# Patient Record
Sex: MALE | Race: WHITE | Employment: UNEMPLOYED | URBAN - METROPOLITAN AREA
[De-identification: names, ages, dates, MRNs, and addresses within clinical notes are randomized per-mention and may not be internally consistent; named-entity substitution may affect disease eponyms.]

---

## 2018-02-02 ENCOUNTER — APPOINTMENT (OUTPATIENT)
Dept: ULTRASOUND IMAGING | Age: 33
DRG: 101 | End: 2018-02-02
Attending: INTERNAL MEDICINE
Payer: MEDICARE

## 2018-02-02 ENCOUNTER — APPOINTMENT (OUTPATIENT)
Dept: CT IMAGING | Age: 33
DRG: 101 | End: 2018-02-02
Attending: INTERNAL MEDICINE
Payer: MEDICARE

## 2018-02-02 ENCOUNTER — HOSPITAL ENCOUNTER (INPATIENT)
Age: 33
LOS: 1 days | Discharge: HOME | DRG: 101 | End: 2018-02-03
Attending: EMERGENCY MEDICINE | Admitting: INTERNAL MEDICINE
Payer: MEDICARE

## 2018-02-02 DIAGNOSIS — R41.82 ALTERED MENTAL STATUS, UNSPECIFIED ALTERED MENTAL STATUS TYPE: ICD-10-CM

## 2018-02-02 DIAGNOSIS — I48.91 ATRIAL FIBRILLATION WITH RVR (HCC): Primary | ICD-10-CM

## 2018-02-02 DIAGNOSIS — I65.23 BILATERAL CAROTID ARTERY STENOSIS: ICD-10-CM

## 2018-02-02 DIAGNOSIS — R56.9 SEIZURE (HCC): ICD-10-CM

## 2018-02-02 DIAGNOSIS — F79 MENTAL IMPAIRMENT: ICD-10-CM

## 2018-02-02 DIAGNOSIS — G40.209 COMPLEX PARTIAL SEIZURE EVOLVING TO GENERALIZED SEIZURE (HCC): ICD-10-CM

## 2018-02-02 DIAGNOSIS — R77.8 ELEVATED TROPONIN: ICD-10-CM

## 2018-02-02 DIAGNOSIS — R55 CONVULSIVE SYNCOPE: ICD-10-CM

## 2018-02-02 DIAGNOSIS — G80.1 SPASTIC DIPLEGIC CEREBRAL PALSY (HCC): ICD-10-CM

## 2018-02-02 PROBLEM — G80.9 CP (CEREBRAL PALSY) (HCC): Status: ACTIVE | Noted: 2018-02-02

## 2018-02-02 PROBLEM — I48.0 PAROXYSMAL ATRIAL FIBRILLATION WITH RVR (HCC): Status: ACTIVE | Noted: 2018-02-02

## 2018-02-02 PROBLEM — G47.33 OSA (OBSTRUCTIVE SLEEP APNEA): Status: ACTIVE | Noted: 2018-02-02

## 2018-02-02 PROBLEM — R79.89 ELEVATED TSH: Status: ACTIVE | Noted: 2018-02-02

## 2018-02-02 LAB
APPEARANCE UR: CLEAR
ATRIAL RATE: 107 BPM
BACTERIA URNS QL MICRO: NEGATIVE /HPF
BILIRUB UR QL: NEGATIVE
CALCULATED P AXIS, ECG09: 20 DEGREES
CALCULATED R AXIS, ECG10: 30 DEGREES
CALCULATED T AXIS, ECG11: 58 DEGREES
CK MB CFR SERPL CALC: 0.7 % (ref 0–2.5)
CK MB SERPL-MCNC: 2.5 NG/ML (ref 5–25)
CK SERPL-CCNC: 346 U/L (ref 39–308)
COLOR UR: ABNORMAL
DIAGNOSIS, 93000: NORMAL
EPITH CASTS URNS QL MICRO: ABNORMAL /LPF
FLUAV AG NPH QL IA: NEGATIVE
FLUBV AG NOSE QL IA: NEGATIVE
GLUCOSE UR STRIP.AUTO-MCNC: NEGATIVE MG/DL
HGB UR QL STRIP: ABNORMAL
KETONES UR QL STRIP.AUTO: NEGATIVE MG/DL
LEUKOCYTE ESTERASE UR QL STRIP.AUTO: NEGATIVE
NITRITE UR QL STRIP.AUTO: NEGATIVE
P-R INTERVAL, ECG05: 164 MS
PH UR STRIP: 5.5 [PH] (ref 5–8)
PROT UR STRIP-MCNC: 300 MG/DL
Q-T INTERVAL, ECG07: 310 MS
QRS DURATION, ECG06: 84 MS
QTC CALCULATION (BEZET), ECG08: 413 MS
RBC #/AREA URNS HPF: ABNORMAL /HPF (ref 0–5)
SP GR UR REFRACTOMETRY: 1.03 (ref 1–1.03)
TROPONIN I SERPL-MCNC: 0.25 NG/ML
TROPONIN I SERPL-MCNC: 0.31 NG/ML
TROPONIN I SERPL-MCNC: 0.32 NG/ML
UA: UC IF INDICATED,UAUC: ABNORMAL
UROBILINOGEN UR QL STRIP.AUTO: 1 EU/DL (ref 0.2–1)
VENTRICULAR RATE, ECG03: 107 BPM
WBC URNS QL MICRO: ABNORMAL /HPF (ref 0–4)

## 2018-02-02 PROCEDURE — 74011250636 HC RX REV CODE- 250/636: Performed by: EMERGENCY MEDICINE

## 2018-02-02 PROCEDURE — 76705 ECHO EXAM OF ABDOMEN: CPT

## 2018-02-02 PROCEDURE — 36415 COLL VENOUS BLD VENIPUNCTURE: CPT | Performed by: INTERNAL MEDICINE

## 2018-02-02 PROCEDURE — 96372 THER/PROPH/DIAG INJ SC/IM: CPT

## 2018-02-02 PROCEDURE — 95816 EEG AWAKE AND DROWSY: CPT | Performed by: INTERNAL MEDICINE

## 2018-02-02 PROCEDURE — C8929 TTE W OR WO FOL WCON,DOPPLER: HCPCS

## 2018-02-02 PROCEDURE — 84484 ASSAY OF TROPONIN QUANT: CPT | Performed by: EMERGENCY MEDICINE

## 2018-02-02 PROCEDURE — 80175 DRUG SCREEN QUAN LAMOTRIGINE: CPT | Performed by: PSYCHIATRY & NEUROLOGY

## 2018-02-02 PROCEDURE — 82553 CREATINE MB FRACTION: CPT | Performed by: EMERGENCY MEDICINE

## 2018-02-02 PROCEDURE — 82550 ASSAY OF CK (CPK): CPT | Performed by: EMERGENCY MEDICINE

## 2018-02-02 PROCEDURE — 74011250637 HC RX REV CODE- 250/637: Performed by: PSYCHIATRY & NEUROLOGY

## 2018-02-02 PROCEDURE — 70450 CT HEAD/BRAIN W/O DYE: CPT

## 2018-02-02 PROCEDURE — 65660000000 HC RM CCU STEPDOWN

## 2018-02-02 PROCEDURE — 74011250637 HC RX REV CODE- 250/637: Performed by: INTERNAL MEDICINE

## 2018-02-02 PROCEDURE — 74011250637 HC RX REV CODE- 250/637: Performed by: NURSE PRACTITIONER

## 2018-02-02 PROCEDURE — 99285 EMERGENCY DEPT VISIT HI MDM: CPT

## 2018-02-02 PROCEDURE — 81001 URINALYSIS AUTO W/SCOPE: CPT | Performed by: INTERNAL MEDICINE

## 2018-02-02 PROCEDURE — 93005 ELECTROCARDIOGRAM TRACING: CPT

## 2018-02-02 PROCEDURE — 87804 INFLUENZA ASSAY W/OPTIC: CPT | Performed by: EMERGENCY MEDICINE

## 2018-02-02 PROCEDURE — 74011250636 HC RX REV CODE- 250/636: Performed by: INTERNAL MEDICINE

## 2018-02-02 PROCEDURE — 93880 EXTRACRANIAL BILAT STUDY: CPT

## 2018-02-02 RX ORDER — LAMOTRIGINE 100 MG/1
100 TABLET ORAL 2 TIMES DAILY
Status: DISCONTINUED | OUTPATIENT
Start: 2018-02-02 | End: 2018-02-02

## 2018-02-02 RX ORDER — LAMOTRIGINE 100 MG/1
100 TABLET ORAL 2 TIMES DAILY
COMMUNITY
End: 2018-02-03

## 2018-02-02 RX ORDER — ENOXAPARIN SODIUM 100 MG/ML
40 INJECTION SUBCUTANEOUS EVERY 12 HOURS
Status: DISCONTINUED | OUTPATIENT
Start: 2018-02-02 | End: 2018-02-03 | Stop reason: HOSPADM

## 2018-02-02 RX ORDER — GUAIFENESIN 100 MG/5ML
81 LIQUID (ML) ORAL DAILY
Status: DISCONTINUED | OUTPATIENT
Start: 2018-02-02 | End: 2018-02-03 | Stop reason: HOSPADM

## 2018-02-02 RX ORDER — SODIUM CHLORIDE 0.9 % (FLUSH) 0.9 %
SYRINGE (ML) INJECTION
Status: DISCONTINUED
Start: 2018-02-02 | End: 2018-02-03 | Stop reason: HOSPADM

## 2018-02-02 RX ORDER — ONDANSETRON 2 MG/ML
4 INJECTION INTRAMUSCULAR; INTRAVENOUS
Status: DISCONTINUED | OUTPATIENT
Start: 2018-02-02 | End: 2018-02-03 | Stop reason: HOSPADM

## 2018-02-02 RX ORDER — METOPROLOL TARTRATE 25 MG/1
12.5 TABLET, FILM COATED ORAL EVERY 12 HOURS
Status: DISCONTINUED | OUTPATIENT
Start: 2018-02-02 | End: 2018-02-03 | Stop reason: HOSPADM

## 2018-02-02 RX ORDER — LORAZEPAM 2 MG/ML
2 INJECTION INTRAMUSCULAR
Status: DISCONTINUED | OUTPATIENT
Start: 2018-02-02 | End: 2018-02-03 | Stop reason: HOSPADM

## 2018-02-02 RX ORDER — ACETAMINOPHEN 325 MG/1
650 TABLET ORAL
Status: DISCONTINUED | OUTPATIENT
Start: 2018-02-02 | End: 2018-02-03 | Stop reason: HOSPADM

## 2018-02-02 RX ADMIN — ENOXAPARIN SODIUM 40 MG: 40 INJECTION SUBCUTANEOUS at 20:21

## 2018-02-02 RX ADMIN — METOPROLOL TARTRATE 12.5 MG: 25 TABLET ORAL at 22:42

## 2018-02-02 RX ADMIN — ASPIRIN 81 MG 81 MG: 81 TABLET ORAL at 10:28

## 2018-02-02 RX ADMIN — METOPROLOL TARTRATE 12.5 MG: 25 TABLET ORAL at 10:29

## 2018-02-02 RX ADMIN — ENOXAPARIN SODIUM 40 MG: 40 INJECTION SUBCUTANEOUS at 10:28

## 2018-02-02 RX ADMIN — LAMOTRIGINE 150 MG: 100 TABLET ORAL at 17:11

## 2018-02-02 NOTE — IP AVS SNAPSHOT
[address] [facility] 
[phone] Patient: Jaspreet Landis MRN: [id number] GUU:[id number] About your hospitalization You were admitted on:  February 2, 2018 You last received care in the:  [facility] 3 NEUROSCIENCE TELEMETRY You were discharged on:  February 3, 2018 Why you were hospitalized Your primary diagnosis was:  Seizures (Hcc) Your diagnoses also included:  Paroxysmal Atrial Fibrillation With Rvr (Hcc), Cp (Cerebral Palsy) (Hcc), Nura (Obstructive Sleep Apnea), Elevated Tsh, Elevated Troponin, Complex Partial Seizure Evolving To Generalized Seizure (Hcc), Altered Mental Status, Unspecified, Mental Impairment, Bilateral Carotid Artery Stenosis, Convulsive Syncope Follow-up Information Follow up With Details Comments Contact Info Dariana Parsons NP In 1 week  [address] [facility] 
[phone] 
  
 [place] [facility] [place]   2 [place] Carmen Murphy MD In 3 weeks 3-4 weeks ( cardiology)  [address] [place] [address] 
[phone] Discharge Orders None A check veda indicates which time of day the medication should be taken. My Medications START taking these medications Instructions Each Dose to Equal  
 Morning Noon Evening Bedtime  
 aspirin 81 mg chewable tablet Start taking on:  2/4/2018 Your last dose was: Your next dose is: Take 1 Tab by mouth daily. 81 mg  
    
   
   
   
  
 metoprolol tartrate 25 mg tablet Commonly known as:  LOPRESSOR Your last dose was: Your next dose is: Take 0.5 Tabs by mouth every twelve (12) hours. 12.5 mg  
    
   
   
   
  
  
CHANGE how you take these medications Instructions Each Dose to Equal  
 Morning Noon Evening Bedtime  
 lamoTRIgine 150 mg tablet Commonly known as: LaMICtal  
What changed:   
- medication strength - how much to take Your last dose was: Your next dose is: Take 1 Tab by mouth two (2) times a day. 150 mg Where to Get Your Medications Information on where to get these meds will be given to you by the nurse or doctor. ! Ask your nurse or doctor about these medications  
  aspirin 81 mg chewable tablet  
 lamoTRIgine 150 mg tablet  
 metoprolol tartrate 25 mg tablet Discharge Instructions Patient Discharge Instructions Oneyda Reaves / [id number] : 1985 Admitted 2018 Discharged: 2/3/2018 DISCHARGE DIAGNOSIS:  
 
Seizure  - Lamictal was increased ; follow with neurology at [facility] as scheduled Atrial fibrillation ( fast heart beat) - started on aspirin and metoprolol  
                                                    - follow with cardiology You may have obstructive sleep apnea - follow with PCP to discuss sleep study. Untreated MIKE can contribute to atrial fibrilation Abnormal thyroid function - test need to be repeated  in 4-6 weeks with PCP Take Home Medications General drug facts If you have a very bad allergy, wear an allergy ID at all times. It is important that you take the medication exactly as they are prescribed. Keep your medication in the bottles provided by the pharmacist. 
Keep a list of all your drugs (prescription, natural products, vitamins, OTC) with you. Give this list to your doctor. Do not take other medications without consulting your doctor. Do not share your drugs with others and do not take anyone else's drugs. Keep all drugs out of the reach of children and pets. Most drugs may be thrown away in household trash after mixing with coffee grounds or german litter and sealing in a plastic bag. Keep a list Call your doctor for help with any side effects. If in the [place] you may also call the FDA at 4-147-FDA-7609 Talk with the doctor before starting any new drug, including OTC, natural products, or vitamins. What to do at [facility] 1. Recommended diet:cardiac 2. Recommended activity: Activity as tolerated 3. If you experience any of the following symptoms then please call your primary care physician or return to the emergency room if you cannot get hold of your doctor: seizures / palpitation/ chest pain 4. Lab work: repeat thyroid function with PCP 5. Bring these papers with you to your follow up appointments. The papers will help your doctors be sure to continue the care plan from the hospital. 
 
 
Follow-up with:  
PCP: Jessica Lam NP Follow-up Information Follow up With Details Comments Contact Info Jessica Lam NP In 1 week  [address] [facility] 
[phone] 
  
 [place] [facility] [place]   2 weeks Marie Marrufo MD In 3 weeks 3-4 weeks ( cardiology)  [address] [facility] [address]. [phone] Please call for your own appointment Information obtained by : 
I understand that if any problems occur once I am at home I am to contact my physician. I understand and acknowledge receipt of the instructions indicated above. Physician's or R.N.'s Signature                                                                  Date/Time Patient or Representative Signature                                                          Date/Time [employer] Announcement We are excited to announce that we are making your provider's discharge notes available to you in [employer].   You will see these notes when they are completed and signed by the physician that discharged you from your recent hospital stay. If you have any questions or concerns about any information you see in [employer], please call the Health Information Department where you were seen or reach out to your Primary Care Provider for more information about your plan of care. Introducing [facility]! [employer] introduces [employer] patient portal. Now you can access parts of your medical record, email your doctor's office, and request medication refills online. 1. In your internet browser, go to [email or web address] 2. Click on the First Time User? Click Here link in the Sign In box. You will see the New Member Sign Up page. 3. Enter your [employer] Access Code exactly as it appears below. You will not need to use this code after youve completed the sign-up process. If you do not sign up before the expiration date, you must request a new code. · [employer] Access Code: [email or web address] Expires: 5/4/2018 12:03 PM 
 
4. Enter the last four digits of your Social Security Number (xxxx) and Date of Birth (mm/dd/yyyy) as indicated and click Submit. You will be taken to the next sign-up page. 5. Create a [employer] ID. This will be your [employer] login ID and cannot be changed, so think of one that is secure and easy to remember. 6. Create a [employer] password. You can change your password at any time. 7. Enter your Password Reset Question and Answer. This can be used at a later time if you forget your password. 8. Enter your e-mail address. You will receive e-mail notification when new information is available in [address] 9. Click Sign Up. You can now view and download portions of your medical record. 10. Click the Download Summary menu link to download a portable copy of your medical information.  
 
If you have questions, please visit the Frequently Asked Questions section of the [employer]. Remember, MyChart is NOT to be used for urgent needs. For medical emergencies, dial 911. Now available from your iPhone and Android! Unresulted Labs-Please follow up with your PCP about these lab tests Order Current Status FROILAN QL, W/REFLEX CASCADE In process LAMOTRIGINE (LAMICTAL) In process LAMOTRIGINE (LAMICTAL) In process VITAMIN D, 25 HYROXY PANEL In process Providers Seen During Your Hospitalization Provider Specialty Primary office phone Aung Thompson. Cindy Perez MD Emergency Medicine [phone] Tahmina Barkley MD Emergency Medicine [phone] Garfield Valdez MD Internal Medicine [phone] Your Primary Care Physician (PCP) Primary Care Physician Office Phone Office Fax Adama bell, [address] [facility] [phone] You are allergic to the following No active allergies Recent Documentation Height Weight BMI Smoking Status 1.727 m 136.1 kg 45.61 kg/m2 Never Smoker Emergency Contacts Name Discharge Info Relation Home Work Mobile Lupis Copeland DISCHARGE CAREGIVER [3] Mother [14] [phone] Patient Belongings The following personal items are in your possession at time of discharge: 
  Dental Appliances: None  Visual Aid: None      Home Medications: None   Jewelry: None  Clothing: At bedside    Other Valuables: None Please provide this summary of care documentation to your next provider. Signatures-by signing, you are acknowledging that this After Visit Summary has been reviewed with you and you have received a copy. Patient Signature:  ____________________________________________________________ Date:  ____________________________________________________________  
  
Diana Garcia Provider Signature:  ____________________________________________________________ Date:  ____________________________________________________________

## 2018-02-02 NOTE — PROGRESS NOTES
TRANSFER - IN REPORT:    Verbal report received from [facility], RN on Cosmo Osler  being received from ER (unit) for routine progression of care      Report consisted of patients Situation, Background, Assessment and   Recommendations(SBAR). Information from the following report(s) SBAR, Kardex and Recent Results was reviewed with the receiving nurse. Opportunity for questions and clarification was provided. Assessment completed upon patients arrival to unit and care assumed.

## 2018-02-02 NOTE — ED NOTES
Pt anxious, needy, unable to explain what he is wanting. Concerned about self removing equipment. Family in with patient.

## 2018-02-02 NOTE — CONSULTS
[address]  [phone]      [address] [facility]     Date of  Admission: 2/2/2018  3:46 AM     Admission type:Emergency    Consult for: afib with RVR, elevated troponin  Consult by: ED MD     Subjective:     Mirella Lo is a 33 y.o. male admitted for Seizures ([facility] [address] No previous cardiac hx. Hx of Cerebral palsy, does not walk, seizures. Admitted to outside hospital with seizure (1st one in 2 years), found to be in afib with RVR at 150-180s. Started on IV Dilt, converted to SR, Dilt discontinued. Family at bedside providing information, as well as patient. Mother states that in the past when he has seizures he does have a fast HR, but never told he had afib. +MIKE, but did not complete full study, unable to wear mask, sleeps in recliner and snores loudly. Patient denies CP, SOB, palpitations, recent illness, n/v/diarrhea. ECG at OSH afib with RVR. ECG in ED SR with no acute changes  Troponin 0.16 - 0.32.  OSH, TSH 6. Repeats pending    Cardiac risk factors: obesity, sedentary life style, male gender.       Patient Active Problem List    Diagnosis Date Noted    Seizures ([facility] [address] 02/02/2018    Paroxysmal atrial fibrillation with RVR ([facility] [address] 02/02/2018    CP (cerebral palsy) ([facility] [address] 02/02/2018    MIKE (obstructive sleep apnea) 02/02/2018    Elevated TSH 02/02/2018    Elevated troponin 02/02/2018      Tevin Edwards NP  Past Medical History:   Diagnosis Date    Cerebral palsy ([facility] [address]     CP (cerebral palsy) ([facility] [address] 2/2/2018    Elevated troponin 2/2/2018    Elevated TSH 2/2/2018    Obesity     MIKE (obstructive sleep apnea) 2/2/2018    Paroxysmal atrial fibrillation with RVR ([facility] [address] 2/2/2018    Seizures ([facility] [address]       Social History     Social History    Marital status: SINGLE     Spouse name: N/A    Number of children: N/A    Years of education: N/A     Social History Main Topics    Smoking status: Never Smoker    Smokeless tobacco: Never Used    Alcohol use None    Drug use: No    Sexual activity: Not Asked     Other Topics Concern    None     Social History Narrative    None     No Known Allergies   History reviewed. No pertinent family history. Current Facility-Administered Medications   Medication Dose Route Frequency    acetaminophen (TYLENOL) tablet 650 mg  650 mg Oral Q4H PRN    ondansetron (ZOFRAN) injection 4 mg  4 mg IntraVENous Q4H PRN    enoxaparin (LOVENOX) injection 40 mg  40 mg SubCUTAneous Q12H    LORazepam (ATIVAN) injection 2 mg  2 mg IntraVENous Q6H PRN    aspirin chewable tablet 81 mg  81 mg Oral DAILY    perflutren lipid microspheres (DEFINITY) contrast injection 2 mL  2 mL IntraVENous ONCE     Current Outpatient Prescriptions   Medication Sig    lamoTRIgine (LAMICTAL) 100 mg tablet Take 100 mg by mouth two (2) times a day.         Review of Symptoms:   Constitutional: negative  Eyes: negative   Ears, nose, mouth, throat, and face: negative  Respiratory: negative   Cardiovascular: negative   Gastrointestinal: negative  Genitourinary:negative   Musculoskeletal:CP, does not walk  Neurological: hx of seizures  Endocrine: negative          Objective:      Visit Vitals    /68 (BP 1 Location: Left arm, BP Patient Position: At rest)    Pulse (!) 117    Temp 99.1 °F (37.3 °C)    Resp 24    Ht 5' 8\" (1.727 m)    Wt 136.1 kg (300 lb)    SpO2 96%    BMI 45.61 kg/m2       Physical:   General: morbidly obese  male in no acute distress  Heart: tachy, no m/S3/JVD, no carotid bruits   Lungs: clear   Abdomen: Soft, +BS, NTND   Extremities: LE shirley +DP/PT, no edema     Data Review:   Please see labs from OSH    Recent Labs      02/02/18   0410   TROIQ  0.32*   CKMB  2.5         Intake/Output Summary (Last 24 hours) at 02/02/18 1016  Last data filed at 02/02/18 0900   Gross per 24 hour   Intake                0 ml   Output              800 ml   Net             -800 ml Cardiographics    Telemetry: ST  ECG: SR with no acute chanages    Echocardiogram: pending  CXRAY:       Assessment:       Principal Problem:    Seizures ([address] (2/2/2018)    Active Problems:    Paroxysmal atrial fibrillation with RVR (HCC) (2/2/2018)      CP (cerebral palsy) (HCC) (2/2/2018)      MIKE (obstructive sleep apnea) (2/2/2018)      Elevated TSH (2/2/2018)      Elevated troponin (2/2/2018)         Plan:     Paroxysmal Afib with RVR:  Remains in SR, start low dose BB  CHADS2 Vasc score: 0, ASA 81mg to start  Monitor on telemetry   Echo pending  TSH elevation and untreated MIKE can contribute to afib occurrences - hospitalist will address    Elevated troponin:  Nonspecific, will monitor, but suspect r/t supply/demand with afib RVR  Patient denies symptoms of ACS  If echo shows any wall motion abnormalities, will recommend nuc stress testing if patient is willing. Thank you for consulting [address] [place] [address], NP       [facility]    2/2/2018         Patient seen, examined by me personally. Plan discussed as detailed. Agree with note as outlined by  NP. I confirm findings in history and physical exam. No additional findings noted. Agree with plan as outlined above. Echo shows normal LV function, wall motion. Continue beta blocker. No further evaluation indicated at this time. Discussed with father at bedside.     Edison Tyler MD

## 2018-02-02 NOTE — ED PROVIDER NOTES
EMERGENCY DEPARTMENT HISTORY AND PHYSICAL EXAM      Date: 2/2/2018  Patient Name: Shakira Weinberg    History of Presenting Illness     Chief Complaint   Patient presents with    Irregular Heart Beat    Seizure       History Provided By: Patient, Patient's Father, EMS and medical records    HPI: Shakira Weinberg, 33 y.o. male with PMHx significant for cerebral palsy and seizures, presents via EMS as a transfer from [facility] ED to [facility] ED with cc of one episode of a tonic clonic seizure that was witnessed by EMS at 2340 yesterday night. Per father, the patient's seizure lasted 5-6 minutes, and the patient was post-ictal after the incident. EMS brought the patient to [facility], and he was found to be in afib with RVR with a heart rate in the 180s. Per medical records, the patient's initial troponin was elevated at 0.14, and there was no repeat troponin. Per medical records, the patient was administered a diltiazem bolus, and he was placed on a diltiazem drip and converted to normal sinus rhythm. EMS reports that the patient was diaphoretic en route. Per father, the patient is prescribed Lamictal BID, and his most recent seizure, prior to today, was 2 years ago. Per father, the patient denies a history of afib. Per father, the patient denies any recent fevers, chills, nausea, vomiting, diarrhea, generalized body aches, headaches, chest pain, or other complaints at this time. PCP: Jessica Lam, NP    There are no other complaints, changes, or physical findings at this time. Past History     Past Medical History:  No past medical history on file. Past Surgical History:  No past surgical history on file. Family History:  No family history on file.     Social History:  Social History   Substance Use Topics    Smoking status: Not on file    Smokeless tobacco: Not on file    Alcohol use Not on file       Allergies:  No Known Allergies      Review of Systems   Review of Systems Constitutional: Negative for chills and fever. HENT: Negative. Negative for congestion, rhinorrhea, sneezing and sore throat. Eyes: Negative. Negative for redness and visual disturbance. Respiratory: Negative. Negative for cough, shortness of breath and wheezing. Cardiovascular: Negative for chest pain and leg swelling. Positive for afib with RVR   Gastrointestinal: Negative. Negative for abdominal pain, diarrhea, nausea and vomiting. Genitourinary: Negative. Negative for difficulty urinating, discharge and frequency. Musculoskeletal: Negative. Negative for arthralgias, back pain, myalgias and neck stiffness. Skin: Negative. Negative for color change and rash. Neurological: Positive for seizures. Negative for dizziness, syncope, weakness, numbness and headaches. Hematological: Negative for adenopathy. Psychiatric/Behavioral: Negative. All other systems reviewed and are negative. Physical Exam   Physical Exam   Constitutional: He is oriented to person, place, and time. Pt is diaphoretic and his shirt is drenched with sweat. Pt often becomes sweaty when he is nervous/anxious. HENT:   Head: Atraumatic. Eyes: EOM are normal.   Cardiovascular: Normal heart sounds and intact distal pulses. Exam reveals no gallop and no friction rub. No murmur heard. Pt is currently in a regular rhythm with a normal rate. Pulmonary/Chest: Effort normal and breath sounds normal. No respiratory distress. He has no wheezes. He has no rales. He exhibits no tenderness. Abdominal: Soft. Bowel sounds are normal. He exhibits no distension and no mass. There is no tenderness. There is no rebound and no guarding. Musculoskeletal: Normal range of motion. He exhibits no edema or tenderness. Neurological: He is alert and oriented to person, place, and time. Pt is at neurologic baseline. Skin: He is diaphoretic. Psychiatric: He has a normal mood and affect.    Nursing note and vitals reviewed. Diagnostic Study Results     Labs -     Recent Results (from the past 12 hour(s))   TROPONIN I    Collection Time: 02/02/18  4:10 AM   Result Value Ref Range    Troponin-I, Qt. 0.32 (H) <0.05 ng/mL   CK W/ REFLX CKMB    Collection Time: 02/02/18  4:10 AM   Result Value Ref Range     (H) 39 - 308 U/L   INFLUENZA A & B AG (RAPID TEST)    Collection Time: 02/02/18  4:10 AM   Result Value Ref Range    Influenza A Antigen NEGATIVE  NEG      Influenza B Antigen NEGATIVE  NEG     CK-MB,QUANT. Collection Time: 02/02/18  4:10 AM   Result Value Ref Range    CK - MB 2.5 <3.6 NG/ML    CK-MB Index 0.7 0 - 2.5         Medical Decision Making   I am the first provider for this patient. I reviewed the vital signs, available nursing notes, past medical history, past surgical history, family history and social history. Vital Signs-Reviewed the patient's vital signs. Patient Vitals for the past 12 hrs:   Temp Pulse Resp BP SpO2   02/02/18 0545 - (!) 103 28 - 95 %   02/02/18 0530 - (!) 103 21 129/74 96 %   02/02/18 0500 - (!) 106 22 123/73 96 %   02/02/18 0430 - (!) 102 20 124/63 95 %   02/02/18 0400 - (!) 109 23 128/73 -   02/02/18 0355 - - - 143/78 -   02/02/18 0351 99.1 °F (37.3 °C) (!) 111 18 143/78 94 %     EKG interpretation: (Preliminary at [facility]) 23:19  Rhythm: afib with RVR; and irregular. Rate (approx.): 181; Axis: normal; CO interval: N/A; QRS interval: normal ; ST/T wave: non-specific changes; Other findings: no other findings. EKG interpretation: (REPEAT at [facility]) 0:20  Rhythm: sinus tachycardia; and regular . Rate (approx.): 121; Axis: normal; CO interval: normal; QRS interval: normal ; ST/T wave: normal; Other findings: no other findings. EKG interpretation: (Preliminary at [facility] ED)  Rhythm: sinus tachycardia; and regular .  Rate (approx.): 107; Axis: normal; CO interval: normal; QRS interval: normal ; ST/T wave: normal; Other findings: no other findings. Written by HI Robin, as dictated by Kevin Muro MD.    Records Reviewed: Nursing Notes, Old Medical Records, Previous electrocardiograms, Previous Radiology Studies and Previous Laboratory Studies    Provider Notes (Medical Decision Making):   Patient transferred from [facility] for new onset afib and troponin elevation. Pt has been in normal sinus rhythm for hours. ED Course:   Initial assessment performed. The patients presenting problems have been discussed, and they are in agreement with the care plan formulated and outlined with them. I have encouraged them to ask questions as they arise throughout their visit. Progress Note:  4:00 AM  Per medical records, the patient had a Na of 138 and a K of 2.8. K was repleted prior to EMS transport. Written by HI Robin, as dictated by Kevin Muro MD.    Progress Note:  5:17 AM  Per nursing staff, the patient has calmed down with the presence of his mother and father, no need to administer ativan. Written by HI Robin, as dictated by Kevin Muro MD.    Progress Note:  6:10 AM  The patient's repeat troponin was 0.32. Written by HI Robin, as dictated by Kevin Muro MD.    Consult Note:  6:30 AM  Kevin Muro MD spoke with Dr. Marga Sharif,  Specialty: Cardiology  Discussed pt's hx, disposition, and available diagnostic and imaging results. Reviewed care plans. Consultant agrees with plans as outlined. Dr. Marga Sharif recommends hospitalist admission with Cardiology consultation. CONSULT NOTE:   6:42 AM  Kevin Muro MD spoke with Rock Meg MD,   Specialty: Hospitalist  Discussed pt's hx, disposition, and available diagnostic and imaging results. Reviewed care plans. Consultant will evaluate pt for admission.   Written by HI Robin, as dictated by Kevin Muro MD.    Critical Care Time: 0 minutes    Admit Note:  6:43 AM  Pt is being admitted by Rock Meg MD. The results of their tests and reason(s) for their admission have been discussed with pt and/or available family. They convey agreement and understanding for the need to be admitted and for admission diagnosis. Diagnosis     Clinical Impression:   1. Atrial fibrillation with RVR (HCC)    2. Seizure (HCC)    3. Elevated troponin        This note is prepared by Ben Aguilar, acting as a Scribe for Damion Chery MD.    Damion Chery MD: The scribe's documentation has been prepared under my direction and personally reviewed by me in its entirety. I confirm that the notes above accurately reflects all work, treatment, procedures, and medical decision making performed by me. This note will not be viewable in [address]

## 2018-02-02 NOTE — PROCEDURES
[facility]  *** FINAL REPORT ***    Name: Trisha Hernandez  MRN: [id number]    Inpatient  : 20 May 1985  HIS Order #: [id number]  [address] [facility] Visit #: [id number]  Date: 2018    TYPE OF TEST: Cerebrovascular Duplex    REASON FOR TEST  CVA    Right Carotid:-             Proximal               Mid                 Distal  cm/s  Systolic  Diastolic  Systolic  Diastolic  Systolic  Diastolic  CCA:     32.8      22.0                            89.0      17.0  Bulb:  ECA:     88.0       7.0  ICA:     60.0      16.0       37.0      10.0       41.0      14.0  ICA/CCA:  0.7       0.9    ICA Stenosis: Normal    Right Vertebral:-  Finding: Not visualized  Sys:  Debra:    Right Subclavian: Normal    Left Carotid:-            Proximal                Mid                 Distal  cm/s  Systolic  Diastolic  Systolic  Diastolic  Systolic  Diastolic  CCA:     44.5      28.0                            87.0      24.0  Bulb:  ECA:     72.0      18.0  ICA:     59.0      20.0       50.0      20.0       78.0      28.0  ICA/CCA:  0.7       0.8    ICA Stenosis: Normal    Left Vertebral:-  Finding: Not visualized  Sys:  Debra:    Left Subclavian: Normal    INTERPRETATION/FINDINGS  PROCEDURE:  Carotid Duplex Examination using B-mode, color and  spectral Doppler of the extracranial cerebrovascular arteries. 1. No evidence of significant arterial occlusive disease in the  internal carotid arteries. 2. No significant stenosis in the external carotid arteries  bilaterally. 3. Unable to visualize either vertebral artery. 4. Normal flow in both subclavian arteries. ADDITIONAL COMMENTS    Technically limited exam due to patient body habitus. I have personally reviewed the data relevant to the interpretation of  this  study. TECHNOLOGIST: Maia Torres. DERRICK Anderson, RDMS  Signed: 2018 03:29 PM    PHYSICIAN: Clyde Malcolm MD  Signed: 2018 05:50 PM

## 2018-02-02 NOTE — PROGRESS NOTES
Bedside shift change report given to Princess Butler RN by Alice Berger RN. Report included the following information SBAR, Kardex and Recent Results. Zone Phone:   [phone]      Significant changes during shift:  Admitted to Neuro        Patient Information    Meghan Byrd  32 y.o.  2/2/2018  3:46 AM by Caitlin Martinez MD. Meghan Byrd was admitted from Home    Problem List    Patient Active Problem List    Diagnosis Date Noted    Seizures ([address] 02/02/2018    Paroxysmal atrial fibrillation with RVR ([address] 02/02/2018    CP (cerebral palsy) ([address] 02/02/2018    MIKE (obstructive sleep apnea) 02/02/2018    Elevated TSH 02/02/2018    Elevated troponin 02/02/2018     Past Medical History:   Diagnosis Date    Cerebral palsy ([address]     CP (cerebral palsy) ([address] 2/2/2018    Elevated troponin 2/2/2018    Elevated TSH 2/2/2018    Obesity     MIKE (obstructive sleep apnea) 2/2/2018    Paroxysmal atrial fibrillation with RVR ([address] 2/2/2018    Seizures ([address]        Activity Status:    OOB to Chair No  Ambulated this shift No   Bed Rest Yes      DVT prophylaxis:    DVT prophylaxis Med- Yes  DVT prophylaxis SCD or KYLEIGH- No     Patient Safety:    Falls Score Total Score: 0  Safety Level_______  Bed Alarm On? Yes  Sitter?  No    Plan for upcoming shift: Rest        Discharge Plan: Yes     Active Consults:  IP CONSULT TO CARDIOLOGY  IP CONSULT TO NEUROLOGY

## 2018-02-02 NOTE — CONSULTS
Dictated    Consult  REFERRED BY:  Dhara English NP    CHIEF COMPLAINT: Seizure      Subjective:     Hilary Herrera is a 33 y.o. right-handed mentally impaired  male seen as a new patient today for new problem of breakthrough seizure at the request of Dr. Hnery Leonard. Patient has significant mental impairment and probable cerebral palsy and seizure disorder since the baby, followed at [facility] and currently on Lamictal 100 mg twice a day. He apparently had a seizure associated with new onset of atrial fibrillation requiring [facility].  His EEG just shows moderate generalized slowing, and a CT of the head showed no acute lesions and patient seems back to his normal baseline. He takes his medications regularly does not miss his medications at all, and his levels are pending at this time. Plan on increasing his Lamictal 150 mg twice a day to give him more protection, and follow his levels. Patient's last seizure was 2 years ago, and most of his seizures are more of a generalized tonic-clonic type. He had no fever, no meningismus, no headache, no unusual stress or tension, and no other exhibiting event for this seizure. Patient is nonambulatory because of his cerebral palsy, and goes from chair to bed existence. Past Medical History:   Diagnosis Date    Cerebral palsy (HCC)     CP (cerebral palsy) ([facility] 2/2/2018    Elevated troponin 2/2/2018    Elevated TSH 2/2/2018    Obesity     MIKE (obstructive sleep apnea) 2/2/2018    Paroxysmal atrial fibrillation with RVR ([facility] 2/2/2018    Seizures ([facility]       History reviewed. No pertinent surgical history.   Family History   Problem Relation Age of Onset    Hypertension Mother     Arthritis-osteo Father       Social History   Substance Use Topics    Smoking status: Never Smoker    Smokeless tobacco: Never Used    Alcohol use Not on file         Current Facility-Administered Medications:     acetaminophen (TYLENOL) tablet 650 mg, 650 mg, Oral, Q4H PRN, Bushra Lyman MD    ondansetron [facility]) injection 4 mg, 4 mg, IntraVENous, Q4H PRN, Bushra Lyman MD    enoxaparin (LOVENOX) injection 40 mg, 40 mg, SubCUTAneous, Q12H, Bushra Lyman MD, 40 mg at 02/02/18 1028    LORazepam (ATIVAN) injection 2 mg, 2 mg, IntraVENous, Q6H PRN, Bushra Lyman MD    aspirin chewable tablet 81 mg, 81 mg, Oral, DAILY, Bushra Lyman MD, 81 mg at 02/02/18 1028    perflutren lipid microspheres (DEFINITY) contrast injection 2 mL, 2 mL, IntraVENous, ONCE, Kim Cohen MD    metoprolol tartrate (LOPRESSOR) tablet 12.5 mg, 12.5 mg, Oral, Q12H, Phil Adams NP, 12.5 mg at 02/02/18 1029    sodium chloride (NS) 0.9 % flush, , , ,     perflutren lipid microspheres (DEFINITY) 1.1 mg/mL contrast injection, , , ,     lamoTRIgine (LaMICtal) tablet 150 mg, 150 mg, Oral, BID, Reinier Carrillo MD, 150 mg at 02/02/18 1711        No Known Allergies     Review of Systems:  A comprehensive review of systems was negative except for: Cardiovascular: positive for palpitations, irregular heart beats, exertional chest pressure/discomfort, dizziness  Neurological: positive for seizures, coordination problems, gait problems and weakness   Vitals:    02/02/18 1052 02/02/18 1053 02/02/18 1054 02/02/18 1220   BP:    145/75   Pulse: (!) 107 (!) 107 (!) 114 100   Resp: 28 28 20 28   Temp:    98.9 °F (37.2 °C)   SpO2: 97% 97% 99% 96%   Weight:       Height:         Objective:     I      NEUROLOGICAL EXAM:    Appearance: The patient is well developed, well nourished, provides a fair history and is in no acute distress. Mental Status: Oriented to place and person, and or the president, cognitive function is abnormal and speech is fluent and no aphasia or dysarthria. Mood and affect appropriate. Cranial Nerves:   Intact visual fields. Fundi are benign. SAMANTHA, EOM's full, no nystagmus, no ptosis. Facial sensation is normal. Corneal reflexes are not tested. Facial movement is symmetric. Hearing is normal bilaterally. Palate is midline with normal sternocleidomastoid and trapezius muscles are normal. Tongue is midline. Neck without meningismus or bruits  Temporal arteries are not tender or enlarged   Motor:  5/5 strength in upper proximal and distal muscles and about 4/5 in the lower extremities proximally and distally. Normal bulk and tone. No fasciculations. Reflexes:   Deep tendon reflexes 1+/4 and symmetrical.  No babinski or clonus present   Sensory:   Normal to touch, pinprick and vibration and temperature slightly decreased in both feet. DSS is intact   Gait:  Not tested gait. Tremor:   No tremor noted. Cerebellar:  No cerebellar signs present. Neurovascular:  Normal heart sounds and regular rhythm, peripheral pulses decreased, and no carotid bruits. Assessment:       ICD-10-CM ICD-9-CM    1. Atrial fibrillation with RVR ([facility]) I48.91 427.31    2. Seizure ([facility] R56.9 780.39    3.  Elevated troponin R74.8 790.6      Principal Problem:    Seizures ([address] (2/2/2018)    Active Problems:    Paroxysmal atrial fibrillation with RVR ([facility]) (2/2/2018)      CP (cerebral palsy) ([facility]) (2/2/2018)      MIKE (obstructive sleep apnea) (2/2/2018)      Elevated TSH (2/2/2018)      Elevated troponin (2/2/2018)      Complex partial seizure evolving to generalized seizure ([address] (2/2/2018)      Altered mental status, unspecified (2/2/2018)      Mental impairment (2/2/2018)      Bilateral carotid artery stenosis (2/2/2018)      Convulsive syncope (2/2/2018)        Plan:     Patient with breakthrough seizure, will increase his Lamictal to 150 mg twice a day, and his EEG shows shows mild generalized slowing, no seizure activity and a CT of the head is negative and patient is back to his normal baseline  We will follow as needed, he can follow back up with [facility] of follow with us as an outpatient  Discussed his condition with the patient and his father in detail and the hospitalist, and we will see again as needed, call if we can help    Signed By: Kerrie Linares MD     February 2, 2018       CC: Pinky Barr NP  FAX: [phone]

## 2018-02-02 NOTE — ED NOTES
Repositioned patient in the bed, family bedside, pt is sinus tachy following a lot of movement repositioning

## 2018-02-02 NOTE — PROGRESS NOTES
[address] [facility]  Preliminary Report:  Carotid Duplex Scan    Right:  No plaque noted in the right carotid system. Right ICA velocities suggest 0% diameter reduction. Right vertebral artery flow is unable to be visualized. Left:  No plaque noted in the left carotid system. Left ICA velocities suggest 0% diameter reduction. Left vertebral artery flow is unable to be visualized. Technically limited due to patient body habitus. Final report to follow.

## 2018-02-02 NOTE — ED NOTES
IV obtained via ultra-sound, urine sent, troponin sent to lab, pt's breakfast delivered, urinal emptied

## 2018-02-02 NOTE — ED NOTES
TRANSFER - OUT REPORT:    Verbal report given to Radha(name) on Yessica Platt  being transferred to Neuro(unit) for ordered procedure       Report consisted of patients Situation, Background, Assessment and   Recommendations(SBAR). Information from the following report(s) SBAR, Kardex and Intake/Output was reviewed with the receiving nurse. Lines:   Peripheral IV 02/02/18 Right Antecubital (Active)   Site Assessment Clean, dry, & intact 2/2/2018  5:45 AM   Phlebitis Assessment 0 2/2/2018  5:45 AM   Infiltration Assessment 0 2/2/2018  5:45 AM   Dressing Status Clean, dry, & intact 2/2/2018  5:45 AM        Opportunity for questions and clarification was provided.       Patient transported with:

## 2018-02-02 NOTE — ED NOTES
Pt resting comfortably, family in with patient. Pt repositioned. Pt in W/C at baseline unable to lift legs or roll compleetly on own.

## 2018-02-02 NOTE — ED TRIAGE NOTES
Pt arrived via [facility] transport. Pt arrived to previous ER in a fib after tonic clonic seizure. Pt has hx of seizures and cerebral palsy. Per [facility], pt has been NSR en route. Troponin 0.14 at previous ER. Pt arrives anxious and diaphoretic. Per father, pt gets diaphoretic when he is anxious. Father at bedside. Will continue to monitor. Call bell within reach.

## 2018-02-02 NOTE — H&P
Hospitalist Admission Note    NAME: Sangita Lackey   :  1985   MRN:  [id number]     Date/Time:  2018 9:10 AM    Patient PCP: Eunice Mccann NP  ________________________________________________________________________    My assessment of this patient's clinical condition and my plan of care is as follows. Assessment / Plan:    Seizures  Hx CP  -Will get CT head. -EEG  -continue lamictal with IV ativan prn  -consult Neurology  -replete electrolytes  -send UA / reflex    Atrial fibrillation, resolved  Mild troponin elevation  -new onset. Probably triggered by stress of seizures and low K.  -Echocardiogram  -repeat TSH with free T4  -cycle troponin  -start ASA  -check Lipid profile    Hypokalemia  -repleted. Recheck. Mg okay (2.6)    Elevated transaminase  -probably has fatty liver. Repeat in AM  -check RUQ US  -check lipid profile and A1c     Severe obesity  Body mass index is 45.61 kg/(m^2). Code Status:   Full  Surrogate Decision Maker:  Parents     DVT Prophylaxis:   lovenox  GI Prophylaxis: not indicated    Baseline:    Lives with parents. Non ambulatory. Subjective:   CHIEF COMPLAINT:   Transferred from OSH for rapid afib    HISTORY OF PRESENT ILLNESS:     Sangita Lackey is a 33 y.o.  male with a history of cerebral palsy and seizures who presents via EMS as a transfer [facility] because of seizures and rapid atrial fibrillation. Patient's last seizure was 2 years ago. He follows with [facility] neurology and is on lamictal 100mg BID for maintenance. Last night, the patient was sitting on a recliner watching TV when mom heard him snoring loudly. She came and found patient unresponsive, drooling, eyes rolled up with generalized shaking. EMS was dispatched and seizure reportedly lasted about 5 minutes (per transfer records. ). On arrival in ER, patient was in rapid afib 180s and was given IV ditiazem bolus followed by infusion.   Kdur 60meq x 1 given for K of 2.8. [facility] and en route, patient converted to NSR. Drip was stopped. Troponin 0.14 ([facility]) [facility] We were asked to admit for work up and evaluation of the above problems. Past Medical History:   Diagnosis Date    Cerebral [facility]y ([facility]     Obesity     Seizures ([facility]         History reviewed. No pertinent surgical history. Social History   Substance Use Topics    Smoking status: Never Smoker    Smokeless tobacco: Never Used    Alcohol use Not on file        FHx, no seizures  No Known Allergies     Prior to Admission medications    Medication Sig Start Date End Date Taking? Authorizing Provider   lamoTRIgine (LAMICTAL) 100 mg tablet Take 100 mg by mouth two (2) times a day. Yes Phys Other, MD       REVIEW OF SYSTEMS:     I am not able to complete the review of systems because: The patient is intubated and sedated    The patient has altered mental status due to his acute medical problems    The patient has baseline aphasia from prior stroke(s)   x The patient has baseline intellectual delay from CP and is not reliable historian    The patient is in acute medical distress and unable to provide information           Objective:   VITALS:    Visit Vitals    /68 (BP 1 Location: Left arm, BP Patient Position: At rest)    Pulse (!) 117    Temp 99.1 °F (37.3 °C)    Resp 24    Ht 5' 8\" (1.727 m)    Wt 136.1 kg (300 lb)    SpO2 96%    BMI 45.61 kg/m2       PHYSICAL EXAM:    General:    Alert, cooperative, no distress, appears stated age. HEENT: Atraumatic, anicteric sclerae, pink conjunctivae     No oral ulcers, mucosa moist, throat clear, dentition fair  Neck:  Supple, symmetrical,  thyroid: non tender  Lungs:   Clear to auscultation bilaterally. No Wheezing or Rhonchi. No rales. Chest wall:  No tenderness  No Accessory muscle use. Heart:   Regular  rhythm,  No  murmur   trace edema  Abdomen:   Soft, non-tender. Not distended. Bowel sounds normal  Extremities: No cyanosis. No clubbing, Skin turgor normal, Capillary refill normal, Radial dial pulse 2+  Skin:     Not pale. Not Jaundiced  No rashes   Psych:  Poor insight. Not depressed. Not anxious or agitated. Neurologic: EOMs intact. No facial asymmetry. No aphasia or slurred speech. BUE equal.  BLE weaker but equal strength, Sensation grossly intact. Alert, recognize parents. Follows simple commands. _______________________________________________________________________  Care Plan discussed with:    Comments   Patient x    Family  x parents   RN     Care Manager                    Consultant:      _______________________________________________________________________  Expected  Disposition:   Home with Family x   HH/PT/OT/RN x   SNF/LTC    ANA CRISTINA    ________________________________________________________________________  TOTAL TIME:  48   Minutes    Critical Care Provided     Minutes non procedure based      Comments    x Reviewed previous records   >50% of visit spent in counseling and coordination of care  Discussion with patient and/or family and questions answered       Given the patient's current clinical presentation, I have a high level of concern for decompensation if discharged from the ED. Complex decision making was performed which includes reviewing the patient's available past medical records, laboratory results, and Xray films. I have also directly communicated my plan and discussed this case with the involved ED physician.     ____________________________________________________________________  Jazmyne Cruz MD    Procedures: see electronic medical records for all procedures/Xrays and details which were not copied into this note but were reviewed prior to creation of Plan.     LAB DATA REVIEWED:    Recent Results (from the past 24 hour(s))   EKG, 12 LEAD, INITIAL    Collection Time: 02/02/18  4:01 AM   Result Value Ref Range    Ventricular Rate 107 BPM    Atrial Rate 107 BPM    P-R Interval 164 ms    QRS Duration 84 ms    Q-T Interval 310 ms    QTC Calculation (Bezet) 413 ms    Calculated P Axis 20 degrees    Calculated R Axis 30 degrees    Calculated T Axis 58 degrees    Diagnosis       Sinus tachycardia    Within normal limits  No previous ECGs available  Confirmed by Fazal Bond ([id number]) on 2/2/2018 8:27:14 AM     TROPONIN I    Collection Time: 02/02/18  4:10 AM   Result Value Ref Range    Troponin-I, Qt. 0.32 (H) <0.05 ng/mL   CK W/ REFLX CKMB    Collection Time: 02/02/18  4:10 AM   Result Value Ref Range     (H) 39 - 308 U/L   INFLUENZA A & B AG (RAPID TEST)    Collection Time: 02/02/18  4:10 AM   Result Value Ref Range    Influenza A Antigen NEGATIVE  NEG      Influenza B Antigen NEGATIVE  NEG     CK-MB,QUANT.     Collection Time: 02/02/18  4:10 AM   Result Value Ref Range    CK - MB 2.5 <3.6 NG/ML    CK-MB Index 0.7 0 - 2.5

## 2018-02-02 NOTE — ED NOTES
Pt cooperative, NS rate of 106, Cardizem drip stopped. Pt showing no signs of seizure like activity.

## 2018-02-02 NOTE — PROGRESS NOTES
Pharmacy Clarification of the Prior to Admission Medication Regimen Retrospective to the Admission Medication Reconciliation    The patient was interviewed regarding clarification of the prior to admission medication regimen. Family was present in room and obtained permission from patient to discuss drug regimen with visitor(s) present. Patient was questioned regarding use of any other inhalers, topical products, over the counter medications, herbal medications, vitamin products or ophthalmic/nasal/otic medication use. Information Obtained From: Patient's mom    Recommendations/Findings: The following amendments were made to the patient's active medication list on file at [facility]:     1) Additions: NONE    2) Removals: NONE    3) Changes: NONE    4) Pertinent Pharmacy Findings:   Updated patients preferred outpatient pharmacy to: [facility] [address] [place] [address] medication list was corrected to the following:     Prior to Admission Medications   Prescriptions Last Dose Informant Patient Reported? Taking?   lamoTRIgine (LAMICTAL) 100 mg tablet 2/1/2018 at 2000 Parent Yes Yes   Sig: Take 100 mg by mouth two (2) times a day.       Facility-Administered Medications: None          Thank you,  Florin Marx CPhT  Medication History Pharmacy Technician

## 2018-02-02 NOTE — PROGRESS NOTES
Pressure Ulcer Prevention Alert Received for Steve < 14 (moderate risk).        Care Plan/Interventions for Nursin. Complete Steve Pressure Ulcer Risk Scale and use sub scores to identify appropriate interventions. 2. Perform Assessment: skin, changes in LOC, visual cues for pain, monitor skin under medical devices  3. Respond to Reduced Sensory Perception: changes in LOC, check visual cues for pain, float heels, suspension boots, pressure redistribution bed/mattress/chair cushion, turning and reposition approximately every 2 hours (pillows & wedges), pad between skin to skin, turn & reposition  4. Manage Moisture: absorbent under pads, internal / external urinary device, internal /  external fecal device, minimize layers, contain wound drainage, access need for specialty bed, limit adult briefs, maintain skin hydration (lotion/cream), moisture barrier, offer toileting every hour  5. Promote Activity: increase time out of bed, chair cushion, PT/OT evaluation, trapeze to reposition, pressure redistribution bed/mattress/chair  6. Address Reduced Mobility: float heels / suspension boot, HOB 30 degrees or less, pressure redistribution bed/mattress/cushion, PT / OT evaluation, turn and reposition approximately every 2 hours (pillows & wedges)  7. Promote Nutrition: document food / fluid / supplement intake, encourage/assist with meals as needed  8. Reduce Friction and Shear: transferring/repositioning devices (lift/draw sheet), lift team/ patient mobility team, feet elevated on foot rest, minimize layers, foam dressing / transparent film / skin sealants, protective barrier creams and emollients, transfer aides (board, Wendy lift, ceiling lift, stand assist), HOB 30 degrees or less, trapeze to reposition.   Wound Care Team

## 2018-02-03 VITALS
HEIGHT: 68 IN | OXYGEN SATURATION: 97 % | BODY MASS INDEX: 45.47 KG/M2 | TEMPERATURE: 98.8 F | SYSTOLIC BLOOD PRESSURE: 130 MMHG | RESPIRATION RATE: 26 BRPM | WEIGHT: 300 LBS | HEART RATE: 98 BPM | DIASTOLIC BLOOD PRESSURE: 98 MMHG

## 2018-02-03 LAB
ALBUMIN SERPL-MCNC: 4 G/DL (ref 3.5–5)
ALBUMIN/GLOB SERPL: 1.1 {RATIO} (ref 1.1–2.2)
ALP SERPL-CCNC: 56 U/L (ref 45–117)
ALT SERPL-CCNC: 58 U/L (ref 12–78)
ANION GAP SERPL CALC-SCNC: 7 MMOL/L (ref 5–15)
AST SERPL-CCNC: 45 U/L (ref 15–37)
BASOPHILS # BLD: 0.1 K/UL (ref 0–0.1)
BASOPHILS NFR BLD: 1 % (ref 0–1)
BILIRUB SERPL-MCNC: 0.8 MG/DL (ref 0.2–1)
BUN SERPL-MCNC: 9 MG/DL (ref 6–20)
BUN/CREAT SERPL: 10 (ref 12–20)
CALCIUM SERPL-MCNC: 8.7 MG/DL (ref 8.5–10.1)
CHLORIDE SERPL-SCNC: 104 MMOL/L (ref 97–108)
CHOLEST SERPL-MCNC: 152 MG/DL
CK SERPL-CCNC: 1556 U/L (ref 39–308)
CO2 SERPL-SCNC: 27 MMOL/L (ref 21–32)
CREAT SERPL-MCNC: 0.9 MG/DL (ref 0.7–1.3)
DIFFERENTIAL METHOD BLD: ABNORMAL
EOSINOPHIL # BLD: 0.2 K/UL (ref 0–0.4)
EOSINOPHIL NFR BLD: 2 % (ref 0–7)
ERYTHROCYTE [DISTWIDTH] IN BLOOD BY AUTOMATED COUNT: 14.4 % (ref 11.5–14.5)
EST. AVERAGE GLUCOSE BLD GHB EST-MCNC: NORMAL MG/DL
FOLATE SERPL-MCNC: 6.4 NG/ML (ref 5–21)
GLOBULIN SER CALC-MCNC: 3.5 G/DL (ref 2–4)
GLUCOSE SERPL-MCNC: 106 MG/DL (ref 65–100)
HBA1C MFR BLD: 4.9 % (ref 4.2–6.3)
HCT VFR BLD AUTO: 46.7 % (ref 36.6–50.3)
HDLC SERPL-MCNC: 30 MG/DL
HDLC SERPL: 5.1 {RATIO} (ref 0–5)
HGB BLD-MCNC: 15.4 G/DL (ref 12.1–17)
IMM GRANULOCYTES # BLD: 0.1 K/UL (ref 0–0.04)
IMM GRANULOCYTES NFR BLD AUTO: 0 % (ref 0–0.5)
LDLC SERPL CALC-MCNC: 83.8 MG/DL (ref 0–100)
LIPID PROFILE,FLP: ABNORMAL
LYMPHOCYTES # BLD: 3 K/UL (ref 0.8–3.5)
LYMPHOCYTES NFR BLD: 26 % (ref 12–49)
MAGNESIUM SERPL-MCNC: 2.3 MG/DL (ref 1.6–2.4)
MCH RBC QN AUTO: 26.6 PG (ref 26–34)
MCHC RBC AUTO-ENTMCNC: 33 G/DL (ref 30–36.5)
MCV RBC AUTO: 80.8 FL (ref 80–99)
MONOCYTES # BLD: 1.1 K/UL (ref 0–1)
MONOCYTES NFR BLD: 9 % (ref 5–13)
NEUTS SEG # BLD: 7.4 K/UL (ref 1.8–8)
NEUTS SEG NFR BLD: 62 % (ref 32–75)
NRBC # BLD: 0 K/UL (ref 0–0.01)
NRBC BLD-RTO: 0 PER 100 WBC
PLATELET # BLD AUTO: 241 K/UL (ref 150–400)
PMV BLD AUTO: 10.8 FL (ref 8.9–12.9)
POTASSIUM SERPL-SCNC: 3.5 MMOL/L (ref 3.5–5.1)
PROT SERPL-MCNC: 7.5 G/DL (ref 6.4–8.2)
RBC # BLD AUTO: 5.78 M/UL (ref 4.1–5.7)
SODIUM SERPL-SCNC: 138 MMOL/L (ref 136–145)
T4 FREE SERPL-MCNC: 1 NG/DL (ref 0.8–1.5)
TRIGL SERPL-MCNC: 191 MG/DL (ref ?–150)
TROPONIN I SERPL-MCNC: 0.14 NG/ML
TSH SERPL DL<=0.05 MIU/L-ACNC: 3.85 UIU/ML (ref 0.36–3.74)
VIT B12 SERPL-MCNC: 321 PG/ML (ref 211–911)
VLDLC SERPL CALC-MCNC: 38.2 MG/DL
WBC # BLD AUTO: 11.9 K/UL (ref 4.1–11.1)

## 2018-02-03 PROCEDURE — 85025 COMPLETE CBC W/AUTO DIFF WBC: CPT | Performed by: INTERNAL MEDICINE

## 2018-02-03 PROCEDURE — 86038 ANTINUCLEAR ANTIBODIES: CPT | Performed by: PSYCHIATRY & NEUROLOGY

## 2018-02-03 PROCEDURE — 74011250637 HC RX REV CODE- 250/637: Performed by: INTERNAL MEDICINE

## 2018-02-03 PROCEDURE — 83036 HEMOGLOBIN GLYCOSYLATED A1C: CPT | Performed by: INTERNAL MEDICINE

## 2018-02-03 PROCEDURE — 82306 VITAMIN D 25 HYDROXY: CPT | Performed by: PSYCHIATRY & NEUROLOGY

## 2018-02-03 PROCEDURE — 80175 DRUG SCREEN QUAN LAMOTRIGINE: CPT | Performed by: PSYCHIATRY & NEUROLOGY

## 2018-02-03 PROCEDURE — 84484 ASSAY OF TROPONIN QUANT: CPT | Performed by: INTERNAL MEDICINE

## 2018-02-03 PROCEDURE — 74011250636 HC RX REV CODE- 250/636: Performed by: INTERNAL MEDICINE

## 2018-02-03 PROCEDURE — 82607 VITAMIN B-12: CPT | Performed by: PSYCHIATRY & NEUROLOGY

## 2018-02-03 PROCEDURE — 80061 LIPID PANEL: CPT | Performed by: INTERNAL MEDICINE

## 2018-02-03 PROCEDURE — 82550 ASSAY OF CK (CPK): CPT | Performed by: INTERNAL MEDICINE

## 2018-02-03 PROCEDURE — 36415 COLL VENOUS BLD VENIPUNCTURE: CPT | Performed by: INTERNAL MEDICINE

## 2018-02-03 PROCEDURE — 82746 ASSAY OF FOLIC ACID SERUM: CPT | Performed by: PSYCHIATRY & NEUROLOGY

## 2018-02-03 PROCEDURE — 74011250637 HC RX REV CODE- 250/637: Performed by: PSYCHIATRY & NEUROLOGY

## 2018-02-03 PROCEDURE — 84439 ASSAY OF FREE THYROXINE: CPT | Performed by: INTERNAL MEDICINE

## 2018-02-03 PROCEDURE — 74011250637 HC RX REV CODE- 250/637: Performed by: NURSE PRACTITIONER

## 2018-02-03 PROCEDURE — 83735 ASSAY OF MAGNESIUM: CPT | Performed by: INTERNAL MEDICINE

## 2018-02-03 PROCEDURE — 84443 ASSAY THYROID STIM HORMONE: CPT | Performed by: INTERNAL MEDICINE

## 2018-02-03 PROCEDURE — 80053 COMPREHEN METABOLIC PANEL: CPT | Performed by: INTERNAL MEDICINE

## 2018-02-03 RX ORDER — LAMOTRIGINE 150 MG/1
150 TABLET ORAL 2 TIMES DAILY
Qty: 60 TAB | Refills: 0 | Status: SHIPPED | OUTPATIENT
Start: 2018-02-03

## 2018-02-03 RX ORDER — GUAIFENESIN 100 MG/5ML
81 LIQUID (ML) ORAL DAILY
Qty: 30 TAB | Refills: 0 | Status: SHIPPED | OUTPATIENT
Start: 2018-02-04

## 2018-02-03 RX ORDER — METOPROLOL TARTRATE 25 MG/1
12.5 TABLET, FILM COATED ORAL EVERY 12 HOURS
Qty: 60 TAB | Refills: 0 | Status: SHIPPED | OUTPATIENT
Start: 2018-02-03

## 2018-02-03 RX ADMIN — ASPIRIN 81 MG 81 MG: 81 TABLET ORAL at 08:16

## 2018-02-03 RX ADMIN — ENOXAPARIN SODIUM 40 MG: 40 INJECTION SUBCUTANEOUS at 08:16

## 2018-02-03 RX ADMIN — METOPROLOL TARTRATE 12.5 MG: 25 TABLET ORAL at 08:16

## 2018-02-03 RX ADMIN — LAMOTRIGINE 150 MG: 100 TABLET ORAL at 08:15

## 2018-02-03 NOTE — PROGRESS NOTES
Hospitalist Progress Note    NAME: Vijaya Nelson   :  1985   MRN:  [id number]       Assessment / Plan:  Seizures  H/o cerebral palsy  - no more seizures since presentation   - EEG: mild to moderate generalized slowing, most consistent with a diffuse encephalopathy of toxic-metabolic or degenerative type. No clear focal process or epileptiform activity was seen  -Head CT: normal   -duplex: no flow limiting stenosis   -seen by neurology: Patient with breakthrough seizure, increase his Lamictal to 150 mg twice a day. Continue to follow with neurology at [facility].      Paroxysmal Atrial fibrillation with RVR, resolved  Mild troponin elevation  -new onset. Probably triggered by stress of seizures and low K.  HR at 110s , sinus tachy   -ECHO: EF 70%, no valvular abnormalities, No hypokinesis    -TSH  Elevated but free T4 is normal   -undiagnosed MIKE may be contributing - follow with PCP for sleep studies   -troponin: mildly elevated   -seen by cardiology: Remains in SR, start BB. CHADS2 Vasc score: 0, ASA 81mg to start. Elevated troponin is nonspecific due to supply/demand with afib RVR. No further w/u      Hypokalemia  -supplement as needed      Elevated transaminase  -due to fatty liver  -US: Hepatic steatosis.     Morbide obesity. Body mass index is 45.61 kg/(m^2).           Code Status:   Full  Surrogate Decision Maker:  Parents   DVT Prophylaxis:   lovenox     Baseline:    Lives with parents. Non ambulatory. Body mass index is 45.61 kg/(m^2). Recommended Disposition: Home w/Family once ok with cardiology      Subjective:     Chief Complaint / Reason for Physician Visit: following seizure /Afib   HR at 100s     Discussed with RN events overnight.      Review of Systems:  Symptom Y/N Comments  Symptom Y/N Comments   Fever/Chills    Chest Pain     Poor Appetite    Edema     Cough    Abdominal Pain     Sputum    Joint Pain     SOB/WEBER    Pruritis/Rash     Nausea/vomit    Tolerating PT/OT     Diarrhea Tolerating Diet     Constipation    Other       Could NOT obtain due to: ams      Objective:     VITALS:   Last 24hrs VS reviewed since prior progress note. Most recent are:  Patient Vitals for the past 24 hrs:   Temp Pulse Resp BP SpO2   02/03/18 0857 - - - (!) 137/96 -   02/03/18 0831 - - - (!) 200/115 -   02/03/18 0736 99.3 °F (37.4 °C) (!) 103 24 (!) 183/112 97 %   02/03/18 0255 98.4 °F (36.9 °C) (!) 102 26 135/88 96 %   02/02/18 2338 98.7 °F (37.1 °C) (!) 105 26 141/86 96 %   02/02/18 1956 98.3 °F (36.8 °C) (!) 107 26 160/71 97 %   02/02/18 1220 98.9 °F (37.2 °C) 100 28 145/75 96 %   02/02/18 1054 - (!) 114 20 - 99 %   02/02/18 1053 - (!) 107 28 - 97 %   02/02/18 1052 - (!) 107 28 - 97 %   02/02/18 1051 - (!) 107 27 - 97 %   02/02/18 1050 - (!) 106 27 - 96 %   02/02/18 1049 - (!) 104 28 - 97 %   02/02/18 1048 - (!) 107 27 - 97 %   02/02/18 1047 - (!) 108 26 - 98 %   02/02/18 1046 - (!) 115 27 - 99 %   02/02/18 1045 - (!) 108 22 - 98 %   02/02/18 1044 - (!) 106 27 - 99 %   02/02/18 1043 - (!) 113 27 - 98 %   02/02/18 1042 - (!) 102 25 - 97 %   02/02/18 1041 - (!) 104 24 - 96 %   02/02/18 1040 - (!) 102 25 - 95 %   02/02/18 1039 - (!) 101 21 - 96 %   02/02/18 1038 - 100 27 - 96 %   02/02/18 1037 - (!) 101 26 - 96 %   02/02/18 1036 - (!) 109 23 - 97 %   02/02/18 1035 - (!) 105 26 - 97 %   02/02/18 1034 - (!) 101 19 - 97 %   02/02/18 1033 - (!) 105 - - 96 %   02/02/18 1032 - (!) 103 - - 97 %   02/02/18 1031 - (!) 105 - - 97 %   02/02/18 1030 - 100 - - 97 %   02/02/18 1029 - (!) 101 - - 97 %   02/02/18 1028 - 100 - - 96 %   02/02/18 1027 - 100 - - 96 %   02/02/18 1026 - (!) 102 - - 95 %   02/02/18 1025 - (!) 101 - - 97 %   02/02/18 1024 - (!) 105 - - 97 %     No intake or output data in the 24 hours ending 02/03/18 0915     PHYSICAL EXAM:  General: WD, WN. Alert, cooperative, no acute distress    EENT:  EOMI. Anicteric sclerae. MMM  Resp:  CTA bilaterally, no wheezing or rales.   No accessory muscle use  CV:  Regular rhythm,  No edema  GI:  Soft, Non distended, Non tender.  +Bowel sounds  Neurologic:  Alert and communicative. Normal speech  Psych:   Poor insight. Not anxious nor agitated  Skin:  No rashes. No jaundice    Reviewed most current lab test results and cultures  YES  Reviewed most current radiology test results   YES  Review and summation of old records today    NO  Reviewed patient's current orders and MAR    YES  PMH/SH reviewed - no change compared to H&P  ________________________________________________________________________  Care Plan discussed with:    Comments   Patient y    Family  y Parents bedside    RN y    Care Manager     Consultant                        Multidiciplinary team rounds were held today with , nursing, pharmacist and clinical coordinator. Patient's plan of care was discussed; medications were reviewed and discharge planning was addressed. ________________________________________________________________________  Total NON critical care TIME:  35   Minutes    Total CRITICAL CARE TIME Spent:   Minutes non procedure based      Comments   >50% of visit spent in counseling and coordination of care     ________________________________________________________________________  Giovanni Fernandez MD     Procedures: see electronic medical records for all procedures/Xrays and details which were not copied into this note but were reviewed prior to creation of Plan. LABS:  I reviewed today's most current labs and imaging studies.   Pertinent labs include:  Recent Labs      02/03/18   0403   WBC  11.9*   HGB  15.4   HCT  46.7   PLT  241     Recent Labs      02/03/18   0403   NA  138   K  3.5   CL  104   CO2  27   GLU  106*   BUN  9   CREA  0.90   CA  8.7   MG  2.3   ALB  4.0   TBILI  0.8   SGOT  45*   ALT  58       Signed: Giovanni Fernandez MD

## 2018-02-03 NOTE — PROCEDURES
[facility]  EEG    Malcolm Oar  MR#: [id number]  : 1985  ACCOUNT #: [de-identified]   DATE OF SERVICE: 2018    CHIEF COMPLAINT:  Seizures. INDICATION:  The patient is a 78-year-old male with a history of breakthrough seizures. EEG to rule out seizures, rule out cortical abnormality. Patient with mental impairment. MEDICATIONS:  Lamictal.    EEG CLASSIFICATION:  Dysrhythmia, grade II, generalized. DESCRIPTION OF THE RECORD:  The background of this recording contains a posteriorly located occipital alpha rhythm of 8-9 Hz that is somewhat poorly formed, but seen in the posterior head regions and does seem to attenuate with some eye opening. During the recording, hyperventilation was not performed. Photic stimulation produced mild driving response in the posterior head regions. The patient did enter states of drowsiness with K complexes and sleep spindles seen in the central head regions. The study showed considerable movement and muscle artifact seen. No clear areas of focal slowing or spike or spike-and-wave discharges were seen. There was a mild to moderate increase in some generalized 2-6 Hz activity, and slightly low amplitudes of the overall background rhythms. No focal process seen, no spike discharges, and no recorded spells of any type. INTERPRETATION:  This is an abnormal electroencephalogram due to the mild to moderate generalized slowing, most consistent with a diffuse encephalopathy of toxic-metabolic or degenerative type. No clear focal process or epileptiform activity was seen. Clinical correlation recommended.       MD CELESTE Jesus / MADDY  D: 2018 17:40     T: 2018 19:10  JOB #: [id number]

## 2018-02-03 NOTE — PROGRESS NOTES
2/3/2018 11:55 AM    Admit Date: 2/2/2018    Admit Diagnosis: Seizures ([address]    Subjective:     Heena Bhandari   denies chest pain, chest pressure/discomfort, dyspnea, palpitations, irregular heart beats, near-syncope. Visit Vitals    BP (!) 130/98 (BP 1 Location: Right arm, BP Patient Position: At rest)    Pulse 98    Temp 98.8 °F (37.1 °C)    Resp 26    Ht 5' 8\" (1.727 m)    Wt 300 lb (136.1 kg)    SpO2 97%    BMI 45.61 kg/m2     Current Facility-Administered Medications   Medication Dose Route Frequency    acetaminophen (TYLENOL) tablet 650 mg  650 mg Oral Q4H PRN    ondansetron (ZOFRAN) injection 4 mg  4 mg IntraVENous Q4H PRN    enoxaparin (LOVENOX) injection 40 mg  40 mg SubCUTAneous Q12H    LORazepam (ATIVAN) injection 2 mg  2 mg IntraVENous Q6H PRN    aspirin chewable tablet 81 mg  81 mg Oral DAILY    metoprolol tartrate (LOPRESSOR) tablet 12.5 mg  12.5 mg Oral Q12H    sodium chloride (NS) 0.9 % flush        perflutren lipid microspheres (DEFINITY) 1.1 mg/mL contrast injection        lamoTRIgine (LaMICtal) tablet 150 mg  150 mg Oral BID         Objective:      Visit Vitals    BP (!) 130/98 (BP 1 Location: Right arm, BP Patient Position: At rest)    Pulse 98    Temp 98.8 °F (37.1 °C)    Resp 26    Ht 5' 8\" (1.727 m)    Wt 300 lb (136.1 kg)    SpO2 97%    BMI 45.61 kg/m2       Physical Exam:  Abdomen: soft, non-tender.  Bowel sounds normal.   Extremities: no cyanosis or edema  Heart: regular rate and rhythm, S1, S2 normal, no murmur, click, rub or gallop  Lungs: clear to auscultation bilaterally  Pulses: 2+    Data Review:   Labs:    Recent Results (from the past 24 hour(s))   TROPONIN I    Collection Time: 02/02/18  5:48 PM   Result Value Ref Range    Troponin-I, Qt. 0.25 (H) <0.05 ng/mL   CBC WITH AUTOMATED DIFF    Collection Time: 02/03/18  4:03 AM   Result Value Ref Range    WBC 11.9 (H) 4.1 - 11.1 K/uL    RBC 5.78 (H) 4.10 - 5.70 M/uL    HGB 15.4 12.1 - 17.0 g/dL    HCT 46.7 36.6 - 50.3 %    MCV 80.8 80.0 - 99.0 FL    MCH 26.6 26.0 - 34.0 PG    MCHC 33.0 30.0 - 36.5 g/dL    RDW 14.4 11.5 - 14.5 %    PLATELET 623 594 - 452 K/uL    MPV 10.8 8.9 - 12.9 FL    NRBC 0.0 0  WBC    ABSOLUTE NRBC 0.00 0.00 - 0.01 K/uL    NEUTROPHILS 62 32 - 75 %    LYMPHOCYTES 26 12 - 49 %    MONOCYTES 9 5 - 13 %    EOSINOPHILS 2 0 - 7 %    BASOPHILS 1 0 - 1 %    IMMATURE GRANULOCYTES 0 0.0 - 0.5 %    ABS. NEUTROPHILS 7.4 1.8 - 8.0 K/UL    ABS. LYMPHOCYTES 3.0 0.8 - 3.5 K/UL    ABS. MONOCYTES 1.1 (H) 0.0 - 1.0 K/UL    ABS. EOSINOPHILS 0.2 0.0 - 0.4 K/UL    ABS. BASOPHILS 0.1 0.0 - 0.1 K/UL    ABS. IMM. GRANS. 0.1 (H) 0.00 - 0.04 K/UL    DF AUTOMATED     METABOLIC PANEL, COMPREHENSIVE    Collection Time: 02/03/18  4:03 AM   Result Value Ref Range    Sodium 138 136 - 145 mmol/L    Potassium 3.5 3.5 - 5.1 mmol/L    Chloride 104 97 - 108 mmol/L    CO2 27 21 - 32 mmol/L    Anion gap 7 5 - 15 mmol/L    Glucose 106 (H) 65 - 100 mg/dL    BUN 9 6 - 20 MG/DL    Creatinine 0.90 0.70 - 1.30 MG/DL    BUN/Creatinine ratio 10 (L) 12 - 20      GFR est AA >60 >60 ml/min/1.73m2    GFR est non-AA >60 >60 ml/min/1.73m2    Calcium 8.7 8.5 - 10.1 MG/DL    Bilirubin, total 0.8 0.2 - 1.0 MG/DL    ALT (SGPT) 58 12 - 78 U/L    AST (SGOT) 45 (H) 15 - 37 U/L    Alk.  phosphatase 56 45 - 117 U/L    Protein, total 7.5 6.4 - 8.2 g/dL    Albumin 4.0 3.5 - 5.0 g/dL    Globulin 3.5 2.0 - 4.0 g/dL    A-G Ratio 1.1 1.1 - 2.2     MAGNESIUM    Collection Time: 02/03/18  4:03 AM   Result Value Ref Range    Magnesium 2.3 1.6 - 2.4 mg/dL   LIPID PANEL    Collection Time: 02/03/18  4:03 AM   Result Value Ref Range    LIPID PROFILE          Cholesterol, total 152 <200 MG/DL    Triglyceride 191 (H) <150 MG/DL    HDL Cholesterol 30 MG/DL    LDL, calculated 83.8 0 - 100 MG/DL    VLDL, calculated 38.2 MG/DL    CHOL/HDL Ratio 5.1 (H) 0 - 5.0     T4, FREE    Collection Time: 02/03/18  4:03 AM   Result Value Ref Range    T4, Free 1.0 0.8 - 1.5 NG/DL   HEMOGLOBIN A1C WITH EAG    Collection Time: 02/03/18  4:03 AM   Result Value Ref Range    Hemoglobin A1c 4.9 4.2 - 6.3 %    Est. average glucose Cannot be calculated mg/dL   VITAMIN B12    Collection Time: 02/03/18  4:03 AM   Result Value Ref Range    Vitamin B12 321 211 - 911 pg/mL   FOLATE    Collection Time: 02/03/18  4:03 AM   Result Value Ref Range    Folate 6.4 5.0 - 21.0 ng/mL   CK    Collection Time: 02/03/18  4:03 AM   Result Value Ref Range    CK 1556 (H) 39 - 308 U/L   TROPONIN I    Collection Time: 02/03/18  4:03 AM   Result Value Ref Range    Troponin-I, Qt. 0.14 (H) <0.05 ng/mL   TSH 3RD GENERATION    Collection Time: 02/03/18  4:03 AM   Result Value Ref Range    TSH 3.85 (H) 0.36 - 3.74 uIU/mL       Telemetry: normal sinus rhythm      Assessment:     Principal Problem:    Seizures ([address] (2/2/2018)    Active Problems:    Paroxysmal atrial fibrillation with RVR ([address] (2/2/2018)      CP (cerebral palsy) ([facility]) (2/2/2018)      MIKE (obstructive sleep apnea) (2/2/2018)      Elevated TSH (2/2/2018)      Elevated troponin (2/2/2018)      Complex partial seizure evolving to generalized seizure ([facility] (2/2/2018)      Altered mental status, unspecified (2/2/2018)      Mental impairment (2/2/2018)      Bilateral carotid artery stenosis (2/2/2018)      Convulsive syncope (2/2/2018)        Plan:     Remains in sinus rhythm. Continue BB and aspirin. Ok to dc from cardiac standpoint.

## 2018-02-03 NOTE — DISCHARGE SUMMARY
Hospitalist Discharge Summary     Patient ID:  Shakira Weinberg  [id number]  7 y.o.  1985    PCP on record: Jessica Lam NP    Admit date: 2/2/2018  Discharge date and time: 2/3/2018      DISCHARGE DIAGNOSIS:    Seizures  H/o cerebral palsy  Paroxysmal Atrial fibrillation with RVR, resolved  Mild troponin elevation  Hypokalemia  Elevated transaminase  Morbide obesity. Body mass index is 45.61 kg/(m^2). Code Status:   Full      CONSULTATIONS:  IP CONSULT TO CARDIOLOGY  IP CONSULT TO NEUROLOGY    Excerpted HPI from H&P of Han Odom MD:    HISTORY OF PRESENT ILLNESS:     Shakira Weinberg is a 33 y.o.  male with a history of cerebral palsy and seizures who presents via EMS as a transfer from [facility] because of seizures and rapid atrial fibrillation. Patient's last seizure was 2 years ago. He follows with [facility] neurology and is on lamictal 100mg BID for maintenance. Last night, the patient was sitting on a recliner watching TV when mom heard him snoring loudly. She came and found patient unresponsive, drooling, eyes rolled up with generalized shaking. EMS was dispatched and seizure reportedly lasted about 5 minutes (per transfer records. ). On arrival in ER, patient was in rapid afib 180s and was given IV ditiazem bolus followed by infusion. Kdur 60meq x 1 given for K of 2.8. It was decided to transfer patient to [facility] and en route, patient converted to NSR. Drip was stopped. Troponin 0.14 ([facility]) and 0.32 at [facility].        We were asked to admit for work up and evaluation of the above problems. ______________________________________________________________________  DISCHARGE SUMMARY/HOSPITAL COURSE:  for full details see H&P, daily progress notes, labs, consult notes.      Seizures  H/o cerebral palsy  - no more seizures since presentation   - EEG: mild to moderate generalized slowing, most consistent with a diffuse encephalopathy of toxic-metabolic or degenerative type.  No clear focal process or epileptiform activity was seen  -Head CT: normal   -duplex: no flow limiting stenosis   -seen by neurology: Patient with breakthrough seizure, increase his Lamictal to 150 mg twice a day. Continue to follow with neurology at [facility].       Paroxysmal Atrial fibrillation with RVR, resolved  Mild troponin elevation  -new onset.  Probably triggered by stress of seizures and low K.  HR at 110s , sinus tachy   -ECHO: EF 70%, no valvular abnormalities, No hypokinesis    -TSH  Elevated but free T4 is normal   -undiagnosed MIKE may be contributing - follow with PCP for sleep studies   -troponin: mildly elevated   -seen by cardiology: Remains in SR, start BB. CHADS2 Vasc score: 0, ASA 81mg to start. Elevated troponin is nonspecific due to supply/demand with afib RVR. No further w/u       Hypokalemia  -supplement as needed       Elevated transaminase  -due to fatty liver  -US: Hepatic steatosis.     Morbide obesity. Body mass index is 45.61 kg/(m^2).               Code Status:   Full  Surrogate Decision Maker:  Parents   DVT Prophylaxis:   lovenox      Baseline:    Lives with parents. [facility] ambulatory. Body mass index is 45.61 kg/(m^2). Recommended Disposition: Home w/Family          _______________________________________________________________________  Patient seen and examined by me on discharge day. PHYSICAL EXAM:  General:                    WD, WN. Alert, cooperative, no acute distress    EENT:                                  EOMI. Anicteric sclerae. MMM  Resp:                                   CTA bilaterally, no wheezing or rales. No accessory muscle use  CV:                                      Regular  rhythm,  No edema  GI:                                       Soft, Non distended, Non tender.  +Bowel sounds  Neurologic:                Alert and communicative.  Normal speech  Psych:                       Poor insight. Not anxious nor agitated  Skin:                                    No rashes. No jaundice  _______________________________________________________________________  DISCHARGE MEDICATIONS:   Current Discharge Medication List      START taking these medications    Details   aspirin 81 mg chewable tablet Take 1 Tab by mouth daily. Qty: 30 Tab, Refills: 0      metoprolol tartrate (LOPRESSOR) 25 mg tablet Take 0.5 Tabs by mouth every twelve (12) hours. Qty: 60 Tab, Refills: 0         CONTINUE these medications which have CHANGED    Details   lamoTRIgine (LAMICTAL) 150 mg tablet Take 1 Tab by mouth two (2) times a day. Qty: 60 Tab, Refills: 0             My Recommended Diet, Activity, Wound Care, and follow-up labs are listed in the patient's Discharge Insturctions which I have personally completed and reviewed.     ______________________________________________________________________    Risk of deterioration: Moderate    Condition at Discharge:  Stable  ______________________________________________________________________    Disposition  Home with family, no needs  ______________________________________________________________________    Care Plan discussed with:   Patient, Family, RN    ______________________________________________________________________    Code Status: Full Code  ______________________________________________________________________      Follow up with:   PCP : Jose Mcdonald NP  Follow-up Information     Follow up With Details [address], NP In 1 week  [address] [facility]  [phone]      neurology at [facility]   2 weeks      Bita ESTRADA MD In 3 weeks 3-4 weeks ( cardiology)  [address] [place] [address] [phone]. [phone]                Total time in minutes spent coordinating this discharge (includes going over instructions, follow-up, prescriptions, and preparing report for sign off to her PCP) :  > 30  minutes    Signed:  Arina Le MD

## 2018-02-03 NOTE — ROUTINE PROCESS
Bedside shift change report given to Ramon Boxer , RN (oncoming) by Natali Ely RN (offgoing). Report included the following information SBAR, Kardex and Recent Results.     Zone Phone:   [phone]        Significant changes during shift: Three family members at bedside all shift           Patient Information     Tommy Hinojosa  33 y.o.  2/2/2018  3:46 AM by Ben Whitten MD. Tommy Hinojosa was admitted from Home     Problem List          Patient Active Problem List     Diagnosis Date Noted    Seizures ([address] 02/02/2018    Paroxysmal atrial fibrillation with RVR ([address] 02/02/2018    CP (cerebral palsy) ([address] 02/02/2018    MIKE (obstructive sleep apnea) 02/02/2018    Elevated TSH 02/02/2018    Elevated troponin 02/02/2018           Past Medical History:   Diagnosis Date    Cerebral palsy ([address]      CP (cerebral palsy) ([address] 2/2/2018    Elevated troponin 2/2/2018    Elevated TSH 2/2/2018    Obesity      MIKE (obstructive sleep apnea) 2/2/2018    Paroxysmal atrial fibrillation with RVR ([address] 2/2/2018    Seizures ([address]           Activity Status:     OOB to Chair No  Ambulated this shift No   Bed Rest Yes        DVT prophylaxis:     DVT prophylaxis Med- Yes  DVT prophylaxis SCD or KYLEIGH- No      Patient Safety:     Falls Score Total Score: 0  Safety Level_______  Bed Alarm On? Yes  Sitter? No     Plan for upcoming shift: Rest, BP monitoring           Discharge Plan:  Yes      Active Consults:  IP CONSULT TO CARDIOLOGY  IP CONSULT TO NEUROLOGY

## 2018-02-03 NOTE — DISCHARGE INSTRUCTIONS
Patient Discharge Instructions    Oneyda Reaves / [id number] : 1985    Admitted 2018 Discharged: 2/3/2018         DISCHARGE DIAGNOSIS:     Seizure  - Lamictal was increased ; follow with neurology at [facility] as scheduled     Atrial fibrillation ( fast heart beat) - started on aspirin and metoprolol                                                       - follow with cardiology       You may have obstructive sleep apnea - follow with PCP to discuss sleep study. Untreated MIKE can contribute to atrial fibrilation     Abnormal thyroid function - test need to be repeated  in 4-6 weeks with PCP             Take Home Medications         General drug facts     If you have a very bad allergy, wear an allergy ID at all times. It is important that you take the medication exactly as they are prescribed. Keep your medication in the bottles provided by the pharmacist.  Keep a list of all your drugs (prescription, natural products, vitamins, OTC) with you. Give this list to your doctor. Do not take other medications without consulting your doctor. Do not share your drugs with others and do not take anyone else's drugs. Keep all drugs out of the reach of children and pets. Most drugs may be thrown away in household trash after mixing with coffee grounds or german litter and sealing in a plastic bag. Keep a list Call your doctor for help with any side effects. If in the [place] you may also call the FDA at 7-102-GKG-2782    Talk with the doctor before starting any new drug, including OTC, natural products, or vitamins. What to do at Home    1. Recommended diet:cardiac     2. Recommended activity: Activity as tolerated    3. If you experience any of the following symptoms then please call your primary care physician or return to the emergency room if you cannot get hold of your doctor: seizures / palpitation/ chest pain     4. Lab work: repeat thyroid function with PCP    5. Bring these papers with you to your follow up appointments. The papers will help your doctors be sure to continue the care plan from the hospital.      Follow-up with:   PCP: Leah Holliday NP  Follow-up Information     Follow up With Details Comments [address], NP In 1 week  [address] [facility]  [phone]      neurology at [facility]   2 weeks      Ariana Aguilera MD In 3 weeks 3-4 weeks ( cardiology)  [address] [place] [address]  [address]              Please call for your own appointment        Information obtained by :  I understand that if any problems occur once I am at home I am to contact my physician. I understand and acknowledge receipt of the instructions indicated above.                                                                                                                                            Physician's or R.N.'s Signature                                                                  Date/Time                                                                                                                                              Patient or Representative Signature                                                          Date/Time

## 2018-02-03 NOTE — PROGRESS NOTES
Patient dc to home with family. All lines removed. Dc instructions reviewed including medications, and follow up appt. Opportunity for questions provided. Patient indicated an understanding.

## 2018-02-05 LAB
ANA SER QL: NEGATIVE
SEE BELOW:, 164879: NORMAL

## 2018-02-05 NOTE — PHYSICIAN ADVISORY
Short Stay Review       Pt Name:  Cosmo Osler   MR#  [id number]   CSN#   [id number]   [address] [place] [address]  3101/01  @ [facility]   Hospitalization date  2/2/2018  3:46 AM   Current Attending Physician  No att. providers found     A discharge order has been placed for this episode of hospital care for Mr. Cosmo Osler; since this hospital stay is less than two midnights, I reviewed Mr. Sadaf Shaw chart. Mr. Sadaf Shaw healthcare insurance/benefit include:  Payor: [employer] / Plan: [place] / Product Type: Medicare /     Utilization Review related clinical summary:   The pt was transferred from outside hospital. He suffers from complex neurological issues including but not limitedto CP and seizure disorder. He was susptected to have breakthrough seizure. And his AED dose was increased.      On the basis of chart review, this patient's hospitalization status      is appropriate for Lorelei Irizarry MD MPH FACP   Physician Advisor    [address] [id number] [address]  [address] [place] [address]   Utilization Review, Care Management         CSN:  [id number]   ANKUSH:   [id number]  Admitted on :  2/2/2018   Discharge order  2/3/2018

## 2018-02-06 LAB
LAMOTRIGINE SERPL-MCNC: 1.2 UG/ML (ref 2–20)
LAMOTRIGINE SERPL-MCNC: 1.7 UG/ML (ref 2–20)

## 2018-02-08 LAB
25(OH)D2 SERPL-MCNC: <1 NG/ML
25(OH)D3 SERPL-MCNC: 24 NG/ML
25(OH)D3+25(OH)D2 SERPL-MCNC: 24 NG/ML

## 2022-03-18 PROBLEM — R55 CONVULSIVE SYNCOPE: Status: ACTIVE | Noted: 2018-02-02

## 2022-03-18 PROBLEM — G40.209 COMPLEX PARTIAL SEIZURE EVOLVING TO GENERALIZED SEIZURE (HCC): Status: ACTIVE | Noted: 2018-02-02

## 2022-03-19 PROBLEM — G47.33 OSA (OBSTRUCTIVE SLEEP APNEA): Status: ACTIVE | Noted: 2018-02-02

## 2022-03-19 PROBLEM — R77.8 ELEVATED TROPONIN: Status: ACTIVE | Noted: 2018-02-02

## 2022-03-19 PROBLEM — I48.0 PAROXYSMAL ATRIAL FIBRILLATION WITH RVR (HCC): Status: ACTIVE | Noted: 2018-02-02

## 2022-03-19 PROBLEM — R41.82 ALTERED MENTAL STATUS, UNSPECIFIED: Status: ACTIVE | Noted: 2018-02-02

## 2022-03-19 PROBLEM — F79 MENTAL IMPAIRMENT: Status: ACTIVE | Noted: 2018-02-02

## 2022-03-19 PROBLEM — I65.23 BILATERAL CAROTID ARTERY STENOSIS: Status: ACTIVE | Noted: 2018-02-02

## 2022-03-20 PROBLEM — R56.9 SEIZURES (HCC): Status: ACTIVE | Noted: 2018-02-02

## 2022-03-20 PROBLEM — R79.89 ELEVATED TSH: Status: ACTIVE | Noted: 2018-02-02

## 2022-03-20 PROBLEM — G80.9 CP (CEREBRAL PALSY) (HCC): Status: ACTIVE | Noted: 2018-02-02

## 2023-05-17 ENCOUNTER — TELEPHONE (OUTPATIENT)
Age: 38
End: 2023-05-17

## 2023-05-18 ENCOUNTER — OFFICE VISIT (OUTPATIENT)
Age: 38
End: 2023-05-18

## 2023-05-18 VITALS
HEART RATE: 98 BPM | HEIGHT: 69 IN | SYSTOLIC BLOOD PRESSURE: 151 MMHG | DIASTOLIC BLOOD PRESSURE: 88 MMHG | BODY MASS INDEX: 43.69 KG/M2 | WEIGHT: 295 LBS

## 2023-05-18 DIAGNOSIS — G47.33 OSA (OBSTRUCTIVE SLEEP APNEA): Primary | ICD-10-CM

## 2023-05-18 DIAGNOSIS — G80.9 CEREBRAL PALSY, UNSPECIFIED TYPE (HCC): ICD-10-CM

## 2023-05-18 DIAGNOSIS — R56.9 SEIZURES (HCC): ICD-10-CM

## 2023-05-18 DIAGNOSIS — F79 MENTAL IMPAIRMENT: ICD-10-CM

## 2023-05-18 RX ORDER — LAMOTRIGINE 25 MG/1
TABLET ORAL
COMMUNITY
Start: 2023-05-15

## 2023-05-18 RX ORDER — LAMOTRIGINE 200 MG/1
200 TABLET ORAL 2 TIMES DAILY
COMMUNITY
Start: 2023-03-20

## 2023-05-18 RX ORDER — NYSTATIN 100000 U/G
CREAM TOPICAL
COMMUNITY
Start: 2023-03-31

## 2023-05-18 RX ORDER — ATENOLOL 50 MG/1
75 TABLET ORAL DAILY
COMMUNITY
Start: 2023-02-20

## 2023-05-18 RX ORDER — HYDROCHLOROTHIAZIDE 25 MG/1
25 TABLET ORAL DAILY
COMMUNITY
Start: 2023-03-21

## 2023-05-18 RX ORDER — MIDAZOLAM 5 MG/.1ML
SPRAY NASAL
COMMUNITY
Start: 2023-05-11

## 2023-05-18 RX ORDER — AMLODIPINE BESYLATE 5 MG/1
5 TABLET ORAL DAILY
COMMUNITY
Start: 2023-03-17

## 2023-05-18 ASSESSMENT — SLEEP AND FATIGUE QUESTIONNAIRES
HOW LIKELY ARE YOU TO NOD OFF OR FALL ASLEEP WHILE WATCHING TV: 2
HOW LIKELY ARE YOU TO NOD OFF OR FALL ASLEEP WHILE SITTING INACTIVE IN A PUBLIC PLACE: 0
HOW LIKELY ARE YOU TO NOD OFF OR FALL ASLEEP WHILE LYING DOWN TO REST IN THE AFTERNOON WHEN CIRCUMSTANCES PERMIT: 0
NECK CIRCUMFERENCE (INCHES): 21.5
HOW LIKELY ARE YOU TO NOD OFF OR FALL ASLEEP WHEN YOU ARE A PASSENGER IN A CAR FOR AN HOUR WITHOUT A BREAK: 0
HOW LIKELY ARE YOU TO NOD OFF OR FALL ASLEEP WHILE SITTING AND TALKING TO SOMEONE: 0
HOW LIKELY ARE YOU TO NOD OFF OR FALL ASLEEP IN A CAR, WHILE STOPPED FOR A FEW MINUTES IN TRAFFIC: 0
HOW LIKELY ARE YOU TO NOD OFF OR FALL ASLEEP WHILE SITTING AND READING: 1
ESS TOTAL SCORE: 3
HOW LIKELY ARE YOU TO NOD OFF OR FALL ASLEEP WHILE SITTING QUIETLY AFTER LUNCH WITHOUT ALCOHOL: 0

## 2023-05-18 NOTE — PROGRESS NOTES
diagnosis and importance of compliance with the treatment plan as well as documenting on the day of the visit. An electronic signature was used to authenticate this note.     -- GO Gooden NP, [facility]  05/18/23

## 2023-05-18 NOTE — PATIENT INSTRUCTIONS
be needed to remove enlarged tissues in the throat. Follow-up care is a key part of your treatment and safety. Be sure to make and go to all appointments, and call your doctor if you are having problems. It's also a good idea to know your test results and keep a list of the medicines you take. How can you care for yourself at home? Lose weight, if needed. It may reduce the number of times you stop breathing or have slowed breathing. Go to bed at the same time every night. Sleep on your side. It may stop mild apnea. If you tend to roll onto your back, sew a pocket in the back of your [place] top. Put a tennis ball into the pocket, and stitch the pocket shut. This will help keep you from sleeping on your back. Avoid alcohol and medicines such as sleeping pills and sedatives before bed. Do not smoke. Smoking can make sleep apnea worse. If you need help quitting, talk to your doctor about stop-smoking programs and medicines. These can increase your chances of quitting for good. Prop up the head of your bed 4 to 6 inches by putting bricks under the legs of the bed. Treat breathing problems, such as a stuffy nose, caused by a cold or allergies. Use a continuous positive airway pressure (CPAP) breathing machine if lifestyle changes do not help your apnea and your doctor recommends it. The machine keeps your airway from closing when you sleep. If CPAP does not help you, ask your doctor whether you should try other breathing machines. A bilevel positive airway pressure machine has two types of air pressureâone for breathing in and one for breathing out. Another device raises or lowers air pressure as needed while you breathe. If your nose feels dry or bleeds when using one of these machines, talk with your doctor about increasing moisture in the air. A humidifier may help.   If your nose is runny or stuffy from using a breathing machine, talk with your doctor about using decongestants or a corticosteroid nasal

## 2023-09-12 ENCOUNTER — TELEPHONE (OUTPATIENT)
Age: 38
End: 2023-09-12

## 2023-09-12 NOTE — TELEPHONE ENCOUNTER
Patient mom called and cancelled sleep study. 4th reschedule. Patient is a 1:1. Mom explains patient has anxiety and makes himself sick about it leading up to study. She wants to speak with pcp about something for anxiety for test. Once she think this may be at [facility], she will call back and reschedule. Offered patients mom an opportunity to bring [facility] in before hand during lunch hours (with appointment) to show him the rooms and hook up process if this will help him be less anxious. She will take us up on this offer if she thinks it will help.